# Patient Record
Sex: FEMALE | Race: BLACK OR AFRICAN AMERICAN | Employment: FULL TIME | URBAN - METROPOLITAN AREA
[De-identification: names, ages, dates, MRNs, and addresses within clinical notes are randomized per-mention and may not be internally consistent; named-entity substitution may affect disease eponyms.]

---

## 2018-06-13 PROBLEM — O09.92 SUPERVISION OF HIGH RISK PREGNANCY IN SECOND TRIMESTER: Status: ACTIVE | Noted: 2018-06-13

## 2018-06-13 PROBLEM — O09.30 LATE PRENATAL CARE: Status: ACTIVE | Noted: 2018-06-13

## 2018-06-20 PROBLEM — A74.9 CHLAMYDIA: Status: ACTIVE | Noted: 2018-06-20

## 2018-07-02 PROBLEM — R87.612 LGSIL ON PAP SMEAR OF CERVIX: Status: ACTIVE | Noted: 2018-07-02

## 2018-08-18 ENCOUNTER — ANESTHESIA EVENT (OUTPATIENT)
Dept: LABOR AND DELIVERY | Age: 33
End: 2018-08-18
Payer: COMMERCIAL

## 2018-08-18 ENCOUNTER — ANESTHESIA (OUTPATIENT)
Dept: LABOR AND DELIVERY | Age: 33
End: 2018-08-18
Payer: COMMERCIAL

## 2018-08-18 ENCOUNTER — HOSPITAL ENCOUNTER (INPATIENT)
Age: 33
LOS: 2 days | Discharge: HOME | End: 2018-08-20
Attending: OBSTETRICS & GYNECOLOGY | Admitting: OBSTETRICS & GYNECOLOGY
Payer: COMMERCIAL

## 2018-08-18 PROBLEM — Z33.1 IUP (INTRAUTERINE PREGNANCY), INCIDENTAL: Status: ACTIVE | Noted: 2018-08-18

## 2018-08-18 LAB
ABO + RH BLD: NORMAL
BASOPHILS # BLD: 0 K/UL (ref 0–0.1)
BASOPHILS NFR BLD: 0 % (ref 0–2)
BLOOD GROUP ANTIBODIES SERPL: NORMAL
DIFFERENTIAL METHOD BLD: NORMAL
EOSINOPHIL # BLD: 0 K/UL (ref 0–0.4)
EOSINOPHIL NFR BLD: 0 % (ref 0–5)
ERYTHROCYTE [DISTWIDTH] IN BLOOD BY AUTOMATED COUNT: 13.9 % (ref 11.6–14.5)
HCT VFR BLD AUTO: 37.3 % (ref 35–45)
HGB BLD-MCNC: 12.7 G/DL (ref 12–16)
LYMPHOCYTES # BLD: 3.1 K/UL (ref 0.9–3.6)
LYMPHOCYTES NFR BLD: 28 % (ref 21–52)
MCH RBC QN AUTO: 29 PG (ref 24–34)
MCHC RBC AUTO-ENTMCNC: 34 G/DL (ref 31–37)
MCV RBC AUTO: 85.2 FL (ref 74–97)
MONOCYTES # BLD: 0.8 K/UL (ref 0.05–1.2)
MONOCYTES NFR BLD: 8 % (ref 3–10)
NEUTS SEG # BLD: 6.9 K/UL (ref 1.8–8)
NEUTS SEG NFR BLD: 64 % (ref 40–73)
PLATELET # BLD AUTO: 217 K/UL (ref 135–420)
PMV BLD AUTO: 10 FL (ref 9.2–11.8)
RBC # BLD AUTO: 4.38 M/UL (ref 4.2–5.3)
SPECIMEN EXP DATE BLD: NORMAL
WBC # BLD AUTO: 10.9 K/UL (ref 4.6–13.2)

## 2018-08-18 PROCEDURE — 65270000029 HC RM PRIVATE

## 2018-08-18 PROCEDURE — 86900 BLOOD TYPING SEROLOGIC ABO: CPT | Performed by: OBSTETRICS & GYNECOLOGY

## 2018-08-18 PROCEDURE — 88307 TISSUE EXAM BY PATHOLOGIST: CPT | Performed by: OBSTETRICS & GYNECOLOGY

## 2018-08-18 PROCEDURE — 76060000078 HC EPIDURAL ANESTHESIA

## 2018-08-18 PROCEDURE — 75410000000 HC DELIVERY VAGINAL/SINGLE

## 2018-08-18 PROCEDURE — 00HU33Z INSERTION OF INFUSION DEVICE INTO SPINAL CANAL, PERCUTANEOUS APPROACH: ICD-10-PCS | Performed by: ANESTHESIOLOGY

## 2018-08-18 PROCEDURE — 74011250636 HC RX REV CODE- 250/636: Performed by: OBSTETRICS & GYNECOLOGY

## 2018-08-18 PROCEDURE — 85025 COMPLETE CBC W/AUTO DIFF WBC: CPT | Performed by: OBSTETRICS & GYNECOLOGY

## 2018-08-18 PROCEDURE — 77030007879 HC KT SPN EPDRL TELE -B: Performed by: ANESTHESIOLOGY

## 2018-08-18 PROCEDURE — 75410000003 HC RECOV DEL/VAG/CSECN EA 0.5 HR

## 2018-08-18 PROCEDURE — 75410000002 HC LABOR FEE PER 1 HR

## 2018-08-18 PROCEDURE — 74011250636 HC RX REV CODE- 250/636: Performed by: ANESTHESIOLOGY

## 2018-08-18 PROCEDURE — 74011000250 HC RX REV CODE- 250

## 2018-08-18 PROCEDURE — 74011250636 HC RX REV CODE- 250/636

## 2018-08-18 PROCEDURE — 74011250637 HC RX REV CODE- 250/637: Performed by: OBSTETRICS & GYNECOLOGY

## 2018-08-18 RX ORDER — FENTANYL CITRATE 50 UG/ML
INJECTION, SOLUTION INTRAMUSCULAR; INTRAVENOUS AS NEEDED
Status: DISCONTINUED | OUTPATIENT
Start: 2018-08-18 | End: 2018-08-18 | Stop reason: HOSPADM

## 2018-08-18 RX ORDER — ZOLPIDEM TARTRATE 5 MG/1
5 TABLET ORAL
Status: DISCONTINUED | OUTPATIENT
Start: 2018-08-18 | End: 2018-08-20 | Stop reason: HOSPADM

## 2018-08-18 RX ORDER — LIDOCAINE HYDROCHLORIDE 10 MG/ML
20 INJECTION, SOLUTION EPIDURAL; INFILTRATION; INTRACAUDAL; PERINEURAL AS NEEDED
Status: DISCONTINUED | OUTPATIENT
Start: 2018-08-18 | End: 2018-08-18

## 2018-08-18 RX ORDER — IBUPROFEN 400 MG/1
800 TABLET ORAL
Status: DISCONTINUED | OUTPATIENT
Start: 2018-08-18 | End: 2018-08-20 | Stop reason: HOSPADM

## 2018-08-18 RX ORDER — SODIUM CHLORIDE 0.9 % (FLUSH) 0.9 %
5-10 SYRINGE (ML) INJECTION EVERY 8 HOURS
Status: DISCONTINUED | OUTPATIENT
Start: 2018-08-18 | End: 2018-08-18

## 2018-08-18 RX ORDER — FENTANYL CITRATE 50 UG/ML
100 INJECTION, SOLUTION INTRAMUSCULAR; INTRAVENOUS ONCE
Status: DISCONTINUED | OUTPATIENT
Start: 2018-08-18 | End: 2018-08-18

## 2018-08-18 RX ORDER — SODIUM CHLORIDE 0.9 % (FLUSH) 0.9 %
5-10 SYRINGE (ML) INJECTION AS NEEDED
Status: DISCONTINUED | OUTPATIENT
Start: 2018-08-18 | End: 2018-08-18 | Stop reason: HOSPADM

## 2018-08-18 RX ORDER — LIDOCAINE HYDROCHLORIDE AND EPINEPHRINE 20; 5 MG/ML; UG/ML
INJECTION, SOLUTION EPIDURAL; INFILTRATION; INTRACAUDAL; PERINEURAL AS NEEDED
Status: DISCONTINUED | OUTPATIENT
Start: 2018-08-18 | End: 2018-08-18 | Stop reason: HOSPADM

## 2018-08-18 RX ORDER — AMOXICILLIN 250 MG
1 CAPSULE ORAL
Status: DISCONTINUED | OUTPATIENT
Start: 2018-08-18 | End: 2018-08-20 | Stop reason: HOSPADM

## 2018-08-18 RX ORDER — BUTORPHANOL TARTRATE 1 MG/ML
2 INJECTION INTRAMUSCULAR; INTRAVENOUS
Status: DISCONTINUED | OUTPATIENT
Start: 2018-08-18 | End: 2018-08-18

## 2018-08-18 RX ORDER — OXYCODONE AND ACETAMINOPHEN 5; 325 MG/1; MG/1
2 TABLET ORAL
Status: DISCONTINUED | OUTPATIENT
Start: 2018-08-18 | End: 2018-08-20 | Stop reason: HOSPADM

## 2018-08-18 RX ORDER — METHYLERGONOVINE MALEATE 0.2 MG/ML
0.2 INJECTION INTRAVENOUS AS NEEDED
Status: DISCONTINUED | OUTPATIENT
Start: 2018-08-18 | End: 2018-08-18

## 2018-08-18 RX ORDER — NALBUPHINE HYDROCHLORIDE 10 MG/ML
10 INJECTION, SOLUTION INTRAMUSCULAR; INTRAVENOUS; SUBCUTANEOUS
Status: DISCONTINUED | OUTPATIENT
Start: 2018-08-18 | End: 2018-08-18

## 2018-08-18 RX ORDER — HYDROMORPHONE HYDROCHLORIDE 2 MG/ML
1 INJECTION, SOLUTION INTRAMUSCULAR; INTRAVENOUS; SUBCUTANEOUS
Status: DISCONTINUED | OUTPATIENT
Start: 2018-08-18 | End: 2018-08-18

## 2018-08-18 RX ORDER — PROMETHAZINE HYDROCHLORIDE 25 MG/ML
25 INJECTION, SOLUTION INTRAMUSCULAR; INTRAVENOUS
Status: DISCONTINUED | OUTPATIENT
Start: 2018-08-18 | End: 2018-08-20 | Stop reason: HOSPADM

## 2018-08-18 RX ORDER — FENTANYL/ROPIVACAINE/NS/PF 2MCG/ML-.1
PLASTIC BAG, INJECTION (ML) EPIDURAL
Status: COMPLETED
Start: 2018-08-18 | End: 2018-08-18

## 2018-08-18 RX ORDER — FENTANYL/ROPIVACAINE/NS/PF 2MCG/ML-.1
1-15 PLASTIC BAG, INJECTION (ML) EPIDURAL
Status: DISCONTINUED | OUTPATIENT
Start: 2018-08-18 | End: 2018-08-18

## 2018-08-18 RX ORDER — MINERAL OIL
30 OIL (ML) ORAL AS NEEDED
Status: COMPLETED | OUTPATIENT
Start: 2018-08-18 | End: 2018-08-18

## 2018-08-18 RX ORDER — PHENYLEPHRINE HCL IN 0.9% NACL 1 MG/10 ML
80 SYRINGE (ML) INTRAVENOUS AS NEEDED
Status: DISCONTINUED | OUTPATIENT
Start: 2018-08-18 | End: 2018-08-18

## 2018-08-18 RX ORDER — TERBUTALINE SULFATE 1 MG/ML
0.25 INJECTION SUBCUTANEOUS
Status: DISCONTINUED | OUTPATIENT
Start: 2018-08-18 | End: 2018-08-18

## 2018-08-18 RX ORDER — OXYTOCIN/RINGER'S LACTATE 20/1000 ML
125 PLASTIC BAG, INJECTION (ML) INTRAVENOUS CONTINUOUS
Status: DISCONTINUED | OUTPATIENT
Start: 2018-08-18 | End: 2018-08-18

## 2018-08-18 RX ORDER — NALOXONE HYDROCHLORIDE 0.4 MG/ML
0.2 INJECTION, SOLUTION INTRAMUSCULAR; INTRAVENOUS; SUBCUTANEOUS AS NEEDED
Status: DISCONTINUED | OUTPATIENT
Start: 2018-08-18 | End: 2018-08-18

## 2018-08-18 RX ORDER — ACETAMINOPHEN 325 MG/1
650 TABLET ORAL
Status: DISCONTINUED | OUTPATIENT
Start: 2018-08-18 | End: 2018-08-20 | Stop reason: HOSPADM

## 2018-08-18 RX ORDER — SODIUM CHLORIDE, SODIUM LACTATE, POTASSIUM CHLORIDE, CALCIUM CHLORIDE 600; 310; 30; 20 MG/100ML; MG/100ML; MG/100ML; MG/100ML
125 INJECTION, SOLUTION INTRAVENOUS CONTINUOUS
Status: DISCONTINUED | OUTPATIENT
Start: 2018-08-18 | End: 2018-08-18

## 2018-08-18 RX ORDER — OXYTOCIN/RINGER'S LACTATE 20/1000 ML
500 PLASTIC BAG, INJECTION (ML) INTRAVENOUS ONCE
Status: COMPLETED | OUTPATIENT
Start: 2018-08-18 | End: 2018-08-18

## 2018-08-18 RX ADMIN — ROPIVACAINE HYDROCHLORIDE 15 ML/HR: 10 INJECTION, SOLUTION EPIDURAL at 06:43

## 2018-08-18 RX ADMIN — LIDOCAINE HYDROCHLORIDE AND EPINEPHRINE 2 ML: 20; 5 INJECTION, SOLUTION EPIDURAL; INFILTRATION; INTRACAUDAL; PERINEURAL at 06:30

## 2018-08-18 RX ADMIN — Medication 10000 MILLI-UNITS/HR: at 08:10

## 2018-08-18 RX ADMIN — Medication 15 ML/HR: at 06:43

## 2018-08-18 RX ADMIN — Medication 30 ML: at 08:05

## 2018-08-18 RX ADMIN — FENTANYL CITRATE 100 MCG: 50 INJECTION, SOLUTION INTRAMUSCULAR; INTRAVENOUS at 06:31

## 2018-08-18 NOTE — PROGRESS NOTES
4727 Dr. Evita Jin. Chino Lambert paged and informed of completely dilated and baby is low per S. Julius Kay RN. MD states she is on her way in.

## 2018-08-18 NOTE — ANESTHESIA PROCEDURE NOTES
Epidural Block    Start time: 8/18/2018 6:15 AM  End time: 8/18/2018 6:32 AM  Performed by: Pino Campbell by: Rafal Taveras     Pre-Procedure  Indication: at surgeon's request and labor epidural    Preanesthetic Checklist: patient identified, risks and benefits discussed, anesthesia consent, site marked, patient being monitored, timeout performed and anesthesia consent    Timeout Time: 06:20        Epidural:   Patient position:  Seated  Prep region:  Lumbar  Prep: Chlorhexidine    Location:  L3-4    Needle and Epidural Catheter:   Needle Type:  Tuohy  Needle Gauge:  17 G  Injection Technique:  Loss of resistance using saline  Attempts:  1  Events: no blood with aspiration, no cerebrospinal fluid with aspiration, no paresthesia and negative aspiration test    Test Dose:  Negative    Assessment:   Catheter Secured:  Tegaderm and tape  Insertion:  Uncomplicated  Patient tolerance:  Patient tolerated the procedure well with no immediate complications

## 2018-08-18 NOTE — IP AVS SNAPSHOT
[address] [place] [id number] 
[phone] Patient: Shea De Leon MRN: [id number] :1985 About your hospitalization You were admitted on:  2018 You last received care in the:  [address] [place] [address] You were discharged on:  2018 Why you were hospitalized Your primary diagnosis was:   Premature Rupture Of Membranes (Pprom) With Onset Of Labor Within 24 Hours Of Rupture In Third Trimester, Antepartum Your diagnoses also included:  Late Prenatal Care, Spontaneous Vaginal Delivery, Iup (Intrauterine Pregnancy), Incidental, Anemia Associated With Acute Blood Loss Follow-up Information Follow up With Details Comments Contact Info None   None (395) Patient stated that they have no PCP Your Scheduled Appointments 2018  9:50 AM EDT  
OB VISIT with Ernie Sandifer, NP  
[facility] ([address] [place] [address]) [address] [place] [phone]  
[phone] 2018  9:50 AM EDT  
OB VISIT with Ernie Sandifer, NP  
[facility] ([address] [place] [address]) [address] [place] [phone]  
[phone] 2018 10:00 AM EDT  
OB VISIT with Dalia Roque MD  
[facility] ([address] [place] [address]) [address] [place] [phone]  
[phone] 2018 10:40 AM EDT  
OB VISIT with Dalia Roque MD  
[facility] ([address] [place] [address]) [address] [place] [phone]  
[phone] Discharge Orders None A check karen indicates which time of day the medication should be taken. My Medications START taking these medications  Instructions Each Dose to Equal  
 Morning Noon Evening Bedtime  
 ibuprofen 800 mg tablet Commonly known as:  MOTRIN Your last dose was: Your next dose is: Take 1 Tab by mouth every eight (8) hours as needed. Indications: Pain 800 mg CONTINUE taking these medications Instructions Each Dose to Equal  
 Morning Noon Evening Bedtime  
 prenatal vit-iron fumarate-fa 27 mg iron- 0.8 mg Tab tablet Your last dose was: Your next dose is: Take 1 Tab by Mouth Once a Day. Where to Get Your Medications These medications were sent to [address], [facility] Hours:  24-hours Phone:  [phone]  
  ibuprofen 800 mg tablet Discharge Instructions POST DELIVERY DISCHARGE INSTRUCTIONS Name: Mel Rosas YOB: 1985 Primary Diagnosis: Active Problems: 
  Late prenatal care (6/13/2018) Overview: Ultrasound 6/15/18  Female fetus. No anomalies observed. Spontaneous vaginal delivery (8/18/2018) Anemia associated with acute blood loss (8/19/2018) General:  
 
Diet/Diet Restrictions: 
Eight 8-ounce glasses of fluid daily (water, juices); avoid excessive caffeine intake. Meals/snacks as desired which are high in fiber and carbohydrates and low in fat and cholesterol. Physical Activity / Restrictions / Safety:  
 
Avoid heavy lifting, no more that 8 lbs. For 2-3 weeks; limit use of stairs to 2 times daily for the first week home. No driving for one week. Avoid intercourse 4-6 weeks, no douching or tampon use. Check with obstetrician before starting or resuming an exercise program.    
 
 
Discharge Instructions/Special Treatment/Home Care Needs:  
 
Continue prenatal vitamins. Continue to use squirt bottle with warm water on your episiotomy after each bathroom use until bleeding stops. If steri-strips applied to your incision, remove in 7-10 days. Call your doctor for the following:  
 
Fever over 101 degrees by mouth. Vaginal bleeding heavier than a normal menstrual period or clot larger than a golf ball. Red streaks or increased swelling of legs, painful red streaks on your breast. 
Painful urination, constipation and increased pain or swelling or discharge with your incision. If you feel extremely anxious or overwhelmed. If you have thoughts of harming yourself and/or your baby. Pain Management:  
 
Pain Management:  
Take Acetaminophen (Tylenol) or Ibuprofen (Advil, Motrin), as directed for pain. Use a warm Sitz bath 3 times daily to relieve episiotomy or hemorrhoidal discomfort. Heating pad to  incision as needed. For hemorrhoidal discomfort, use Tucks and Anusol cream as needed and directed. Follow-Up Care: These are general instructions for a healthy lifestyle: No smoking/ No tobacco products/ Avoid exposure to second hand smoke Surgeon General's Warning:  Quitting smoking now greatly reduces serious risk to your health. Obesity, smoking, and sedentary lifestyle greatly increases your risk for illness A healthy diet, regular physical exercise & weight monitoring are important for maintaining a healthy lifestyle Recognize signs and symptoms of STROKE: 
 
F-face looks uneven A-arms unable to move or move unevenly S-speech slurred or non-existent T-time-call 911 as soon as signs and symptoms begin-DO NOT go Back to bed or wait to see if you get better-TIME IS BRAIN. Signed By: Monica Chavarria                                                                                                   Date: 2018 Time: 10:27 AM 
 
Patient armband removed and given to patient to take home. Patient was informed of the privacy risks if armband lost or stolen [facility] Announcement We are excited to announce that we are making your provider's discharge notes available to you in [employer]. You will see these notes when they are completed and signed by the physician that discharged you from your recent hospital stay. If you have any questions or concerns about any information you see in [employer], please call the Health Information Department where you were seen or reach out to your Primary Care Provider for more information about your plan of care. Introducing [facility]! [address] introduces [employer] patient portal. Now you can access parts of your medical record, email your doctor's office, and request medication refills online. 1. In your internet browser, go to [email or web address] 2. Click on the First Time User? Click Here link in the Sign In box. You will see the New Member Sign Up page. 3. Enter your [employer] Access Code exactly as it appears below. You will not need to use this code after youve completed the sign-up process. If you do not sign up before the expiration date, you must request a new code. · [employer] Access Code: [email or web address] Expires: 9/11/2018 10:06 AM 
 
4. Enter the last four digits of your Social Security Number (xxxx) and Date of Birth (mm/dd/yyyy) as indicated and click Submit. You will be taken to the next sign-up page. 5. Create a [employer] ID. This will be your [employer] login ID and cannot be changed, so think of one that is secure and easy to remember. 6. Create a [employer] password. You can change your password at any time. 7. Enter your Password Reset Question and Answer. This can be used at a later time if you forget your password. 8. Enter your e-mail address. You will receive e-mail notification when new information is available in [address] 9. Click Sign Up. You can now view and download portions of your medical record.  
10. Click the Download Summary menu link to download a portable copy of your medical information. If you have questions, please visit the Frequently Asked Questions section of the [employer] website. Remember, [employer] is NOT to be used for urgent needs. For medical emergencies, dial 911. Now available from your iPhone and Android! Introducing Dontae Pastrana As a [facility] patient, I wanted to make you aware of our electronic visit tool called Dontae Pastrana. [employer] allows you to connect within minutes with a medical provider 24 hours a day, seven days a week via a mobile device or tablet or logging into a secure website from your computer. You can access Dontae Pastrana from anywhere in the [place]. A virtual visit might be right for you when you have a simple condition and feel like you just dont want to get out of bed, or cant get away from work for an appointment, when your regular [facility] provider is not available (evenings, weekends or holidays), or when youre out of town and need minor care. Electronic visits cost only $49 and if the [employer]enef[employer]/LSEO provider determines a prescription is needed to treat your condition, one can be electronically transmitted to a nearby pharmacy*. Please take a moment to enroll today if you have not already done so. The enrollment process is free and takes just a few minutes. To enroll, please download the Kno yang to your tablet or phone, or visit [email or web address] to enroll on your computer. And, as an [address] [place] [address] patient with a [employer] account, the results of your visits will be scanned into your electronic medical record and your primary care provider will be able to view the scanned results. We urge you to continue to see your regular [facility] provider for your ongoing medical care.   And while your primary care provider may not be the one available when you seek a Dontae Pastrana virtual visit, the peace of mind you get from getting a real diagnosis real time can be priceless. For more information on Dontae Pastrana, view our Frequently Asked Questions (FAQs) at [email or web address]. Sincerely, 
 
Birdie Balderas MD 
Chief Medical Officer [address] *:  certain medications cannot be prescribed via Dontae Pastrana Providers Seen During Your Hospitalization Provider Specialty Primary office phone Jose Cruz Harris MD Obstetrics & Gynecology [phone] Your Primary Care Physician (PCP) Primary Care Physician Office Phone Office Fax NONE ** None ** ** None ** You are allergic to the following No active allergies Recent Documentation Height Weight Breastfeeding? BMI OB Status Smoking Status 1.626 m 66.2 kg Unknown 25.06 kg/m2 Recent pregnancy Former Smoker Emergency Contacts Name Discharge Info Relation Home Work Mobile Cousin,[place] DISCHARGE CAREGIVER [3] Boyfriend [17] [phone] Patient Belongings The following personal items are in your possession at time of discharge: 
  Dental Appliances: None         Home Medications: None   Jewelry: Body Piercing  Clothing: None    Other Valuables: Cell Phone Please provide this summary of care documentation to your next provider. Signatures-by signing, you are acknowledging that this After Visit Summary has been reviewed with you and you have received a copy. Patient Signature:  ____________________________________________________________ Date:  ____________________________________________________________  
  
Althia Kras Provider Signature:  ____________________________________________________________ Date:  ____________________________________________________________

## 2018-08-18 NOTE — ROUTINE PROCESS
322:  36w4d patient arrives to unit via ER by wheelchair with partner c/o ROM at 225. Ctx on/off all day. Took patient to room 3 and oriented to room. 0330: Triage questions. Nitrazine equivical.  SVE by TESSA Andrews, RN -3/70/0. Set up EFM and TOCO. After reactive strip, got patient some disposable underwear and a pad to ambulate in. Patient chose to stay in bed.      0452: Paged Dr. Fabien Pruett  0510: Paged Dr. Haines Simpler: Called Dr. Fabien Pruett via cell and discussed patient. Orders to admit. 6769: SVE     615  @ bedside discussing epidural.   618 Consent form signed. 620 Timeout performed. 630 Test dose administered. 631 Catheter placed. 633 Loading dose administered. 635 Pt lying back in bed. 640 PCA pump started. 0720: Bedside and Verbal shift change report given to JULES Casarez (oncoming nurse) by EMILEE Collisn (offgoing nurse). Report included the following information SBAR, Intake/Output, MAR and Recent Results.

## 2018-08-18 NOTE — PROGRESS NOTES
Received patient form L&D. Report received from Arelis. Panfilo Beasley 95 at bedside. Assessment completed. Oriented patient to room, call bell included. Informed of available pain medication. Instructed to call for increased pain, bleeding, large clots and when she needs to void. Patient verbalized understanding. Reviewed admission folder and its contents.

## 2018-08-18 NOTE — ANESTHESIA PREPROCEDURE EVALUATION
Anesthetic History   No history of anesthetic complications            Review of Systems / Medical History  Patient summary reviewed, nursing notes reviewed and pertinent labs reviewed    Pulmonary  Within defined limits                 Neuro/Psych   Within defined limits           Cardiovascular  Within defined limits                     GI/Hepatic/Renal  Within defined limits              Endo/Other  Within defined limits           Other Findings              Physical Exam    Airway  Mallampati: II  TM Distance: 4 - 6 cm  Neck ROM: normal range of motion   Mouth opening: Normal     Cardiovascular  Regular rate and rhythm,  S1 and S2 normal,  no murmur, click, rub, or gallop             Dental  No notable dental hx       Pulmonary  Breath sounds clear to auscultation               Abdominal  GI exam deferred       Other Findings            Anesthetic Plan    ASA: 2  Anesthesia type: epidural          Induction: Intravenous  Anesthetic plan and risks discussed with: Patient

## 2018-08-18 NOTE — PROGRESS NOTES
0720 Bedside and Verbal shift change report given to JULES Black RN (oncoming nurse) by EMILEE Pinedo (offgoing nurse). Report included the following information SBAR, Kardex, Intake/Output and MAR .     0738 Prolonged decel to the 90s. This RN to patient bedside. Patient turned to left lateral.  0744 Patient turned right lateral.  0747 Patient turned to back for FSE placement. Patient SVE 10/100/+3. Called out to RN station for MD to be called to delivery. 138 [facility] GENET Shafer at bedside for delivery. 6145  of viable female infant. Delayed cord clamping of 3 minutes. 7861 Dr Doug Nicole at patient bedside. 2094  of placenta. 1006 Patient up to bathroom. Patient able to void at this time a large amount. Patient requires 1 person assistance to be up at this time. Rocío care performed. Pads and gown changed. 1032 TRANSFER - OUT REPORT:    Verbal report given to LYUDMILA Vargas RN(name) on Karthikeyan Carroll  being transferred to Mother Baby(unit) for routine progression of care       Report consisted of patients Situation, Background, Assessment and   Recommendations(SBAR). Information from the following report(s) SBAR, Kardex, Intake/Output and MAR was reviewed with the receiving nurse. Lines:   Peripheral IV 18 Right Wrist (Active)        Opportunity for questions and clarification was provided.       Patient transported with:   Registered Nurse

## 2018-08-18 NOTE — IP AVS SNAPSHOT
Summary of Care Report The Summary of Care report has been created to help improve care coordination. Users with access to [employer] or [address] (Web-based application) may access additional patient information including the Discharge Summary. If you are not currently a [address] user and need more information, please call the number listed below in the [place] [address] section and ask to be connected with Medical Records. Facility Information Name Address Phone [address] [facility] [address] [phone] Patient Information Patient Name Sex  Esther Perez ([id number]) Female 1985 Discharge Information Admitting Provider Service Area Unit  
 Macario Rivera MD / [phone] [address] / [phone] Discharge Provider Discharge Date/Time Discharge Disposition Destination (none) 2018 Morning (Pending) AHR (none) Patient Language Language ENGLISH [13] Hospital Problems as of 2018  Reviewed: 2018  9:40 AM by Macario Rivera MD  
  
  
  
 Class Noted - Resolved Last Modified POA Active Problems Late prenatal care  2018 - Present 2018 by Macario Rivera MD Yes Entered by Mark Anthony Reynolds NP Overview Signed 2018 10:38 AM by Macario Rivrea MD  
   Ultrasound 6/15/18  Female fetus. No anomalies observed. Spontaneous vaginal delivery  2018 - Present 2018 by Macario Rivera MD No  
  Entered by Macario Rivera MD  
  Anemia associated with acute blood loss  2018 - Present 2018 by Macario Rivera MD No  
  Entered by Macario Rivera MD  
  
Non-Hospital Problems as of 2018  Reviewed: 2018  9:40 AM by Macario Rivera MD  
  
  
  
 Class Noted - Resolved Last Modified Active Problems Supervision of high risk pregnancy in second trimester  6/13/2018 - Present 6/27/2018 by Nasreen Negrete MD  
  Entered by Lucero Amin NP Overview Addendum 6/27/2018 10:34 AM by Nasreen Negrete MD  
    Female, will be named Praveen Abdalla Chlamydia  6/20/2018 - Present 8/9/2018 by Lucero Amin NP Entered by Lucero Amin NP Overview Addendum 8/9/2018 11:56 AM by Lucero Amin NP  
   6/13/18 SARAH BETH in 4 weeks - collected 8/9/18 LGSIL on Pap smear of cervix  7/2/2018 - Present 7/2/2018 by Lucero Amin NP Entered by Lucero Amin NP Overview Signed 7/2/2018  8:19 AM by Lucero Amin NP  
   + HPV, neg 16/18/45. Colpo postpartum You are allergic to the following No active allergies Current Discharge Medication List  
  
START taking these medications Dose & Instructions Dispensing Information Comments  
 ibuprofen 800 mg tablet Commonly known as:  MOTRIN Dose:  800 mg Take 1 Tab by mouth every eight (8) hours as needed. Indications: Pain Quantity:  40 Tab Refills:  1 CONTINUE these medications which have NOT CHANGED Dose & Instructions Dispensing Information Comments  
 prenatal vit-iron fumarate-fa 27 mg iron- 0.8 mg Tab tablet Take 1 Tab by Mouth Once a Day. Refills:  0 Current Immunizations Name Date Tdap 6/13/2018 Surgery Information ID Date/Time Status Primary Surgeon All Procedures Location [id number] 8/18/2018 Posted   [facility]IA [facility] - DO NOT SCHEDULE Follow-up Information Follow up With Details Comments Contact Info None   None (395) Patient stated that they have no PCP Discharge Instructions POST DELIVERY DISCHARGE INSTRUCTIONS Name: Amadou Prince YOB: 1985 Primary Diagnosis: Active Problems: 
  Late prenatal care (6/13/2018) Overview: Ultrasound 6/15/18  Female fetus. No anomalies observed. Spontaneous vaginal delivery (2018) Anemia associated with acute blood loss (2018) General:  
 
Diet/Diet Restrictions: 
Eight 8-ounce glasses of fluid daily (water, juices); avoid excessive caffeine intake. Meals/snacks as desired which are high in fiber and carbohydrates and low in fat and cholesterol. Physical Activity / Restrictions / Safety:  
 
Avoid heavy lifting, no more that 8 lbs. For 2-3 weeks; limit use of stairs to 2 times daily for the first week home. No driving for one week. Avoid intercourse 4-6 weeks, no douching or tampon use. Check with obstetrician before starting or resuming an exercise program.    
 
 
Discharge Instructions/Special Treatment/Home Care Needs:  
 
Continue prenatal vitamins. Continue to use squirt bottle with warm water on your episiotomy after each bathroom use until bleeding stops. If steri-strips applied to your incision, remove in 7-10 days. Call your doctor for the following:  
 
Fever over 101 degrees by mouth. Vaginal bleeding heavier than a normal menstrual period or clot larger than a golf ball. Red streaks or increased swelling of legs, painful red streaks on your breast. 
Painful urination, constipation and increased pain or swelling or discharge with your incision. If you feel extremely anxious or overwhelmed. If you have thoughts of harming yourself and/or your baby. Pain Management:  
 
Pain Management:  
Take Acetaminophen (Tylenol) or Ibuprofen (Advil, Motrin), as directed for pain. Use a warm Sitz bath 3 times daily to relieve episiotomy or hemorrhoidal discomfort. Heating pad to  incision as needed. For hemorrhoidal discomfort, use Tucks and Anusol cream as needed and directed. Follow-Up Care: These are general instructions for a healthy lifestyle: No smoking/ No tobacco products/ Avoid exposure to second hand smoke Surgeon General's Warning:  Quitting smoking now greatly reduces serious risk to your health. Obesity, smoking, and sedentary lifestyle greatly increases your risk for illness A healthy diet, regular physical exercise & weight monitoring are important for maintaining a healthy lifestyle Recognize signs and symptoms of STROKE: 
 
F-face looks uneven A-arms unable to move or move unevenly S-speech slurred or non-existent T-time-call 911 as soon as signs and symptoms begin-DO NOT go Back to bed or wait to see if you get better-TIME IS BRAIN. Signed By: Jung Jo                                                                                                   Date: 8/20/2018 Time: 10:27 AM 
 
Patient armband removed and given to patient to take home. Patient was informed of the privacy risks if armband lost or stolen Chart Review Routing History No Routing History on File

## 2018-08-18 NOTE — PROGRESS NOTES
Bedside and Verbal shift change report given to Francisco Fraser RN (oncoming nurse) by Ray Lee RN   (offgoing nurse). Report given with SBAR and Kardex.

## 2018-08-18 NOTE — H&P
History & Physical    Name: Brian El MRN: [id number]  SSN: [id number]    YOB: 1985  Age: 34 y.o. Sex: female        Subjective:     Estimated Date of Delivery: 18  OB History    Para Term  AB Living   4 1 1 0 2 1   SAB TAB Ectopic Molar Multiple Live Births   0 2 0 0 0 1      # Outcome Date GA Lbr Ky/2nd Weight Sex Delivery Anes PTL Lv   4 Current            3 Term 08   6 lb 14 oz (3.118 kg) M Vag-Spont   JOSE LUIS   2 TAB            1 TAB                   Ms. Louann George is admitted with pregnancy at 36w4d for spontaneous rupture of membranes and contractions. Prenatal course was complicated by late prenatal care. Please see prenatal records for details. Past Medical History:   Diagnosis Date    Anemia     Chlamydia     tested on 18 - cured    Vaginal delivery     x1     Past Surgical History:   Procedure Laterality Date    HX TONSILLECTOMY       Social History     Occupational History    Not on file. Social History Main Topics    Smoking status: Former Smoker    Smokeless tobacco: Never Used    Alcohol use No    Drug use: No    Sexual activity: Yes     Partners: Male     Birth control/ protection: None     Family History   Problem Relation Age of Onset    Breast Cancer Mother 35    Hypertension Father        No Known Allergies  Prior to Admission medications    Medication Sig Start Date End Date Taking? Authorizing Provider   prenatal vit-iron fumarate-fa 27 mg iron- 0.8 mg tab tablet Take 1 Tab by Mouth Once a Day. Yes Historical Provider        Review of Systems: Review of systems not obtained due to patient factors. Objective:     Vitals:  Vitals:    18 0711 18 0716 18 0720 18 0721   BP:  128/70 128/70    Pulse:  72 68    Resp:   16    Temp:   98.1 °F (36.7 °C)    SpO2: 100% 100%  100%   Weight:       Height:            Physical Exam:  Patient without distress.   Abdomen: soft, nontender  Cervical Exam: 2 cm dilated on admission per RN  Membranes:   Premature Rupture of Membranes; Amniotic Fluid: clear fluid  Fetal Heart Rate: Reactive    Prenatal Labs:   Lab Results   Component Value Date/Time    ABO/Rh(D) B POSITIVE 2018 05:49 AM    Rubella, External Immune 2018    GrBStrep, External Negative 2018    HBsAg, External negative 2018    HIV, External NR 2018    RPR, External NR 2018    Gonorrhea, External Negative 2018    Chlamydia, External Negative 2018    ABO,Rh B+ 2018         Assessment/Plan:     Principal Problem:     premature rupture of membranes (PPROM) with onset of labor within 24 hours of rupture in third trimester, antepartum (2018)    Active Problems:    Late prenatal care (2018)      Overview: Ultrasound 6/15/18  Female fetus. No anomalies observed. Spontaneous vaginal delivery (2018)         Plan: Admit for Continue plan for vaginal delivery. Group B Strep was negative.     Signed By:  Heather Shaver MD     2018

## 2018-08-19 PROBLEM — D62 ANEMIA ASSOCIATED WITH ACUTE BLOOD LOSS: Status: ACTIVE | Noted: 2018-08-19

## 2018-08-19 PROBLEM — Z33.1 IUP (INTRAUTERINE PREGNANCY), INCIDENTAL: Status: RESOLVED | Noted: 2018-08-18 | Resolved: 2018-08-19

## 2018-08-19 LAB
HCT VFR BLD AUTO: 31.1 % (ref 35–45)
HGB BLD-MCNC: 10.3 G/DL (ref 12–16)

## 2018-08-19 PROCEDURE — 65270000029 HC RM PRIVATE

## 2018-08-19 PROCEDURE — 85018 HEMOGLOBIN: CPT | Performed by: OBSTETRICS & GYNECOLOGY

## 2018-08-19 PROCEDURE — 36415 COLL VENOUS BLD VENIPUNCTURE: CPT | Performed by: OBSTETRICS & GYNECOLOGY

## 2018-08-19 PROCEDURE — 85014 HEMATOCRIT: CPT | Performed by: OBSTETRICS & GYNECOLOGY

## 2018-08-19 NOTE — PROGRESS NOTES
Post-Partum Day Number 1 Progress Note    Pura Hooks     Assessment:   Hospital Problems  Date Reviewed: 2018          Codes Class Noted POA    * (Principal) premature rupture of membranes (PPROM) with onset of labor within 24 hours of rupture in third trimester, antepartum ICD-10-CM: O42.013  ICD-9-CM: 658.13  2018 Yes        Spontaneous vaginal delivery ICD-10-CM: O80  ICD-9-CM: 650  2018 No        IUP (intrauterine pregnancy), incidental ICD-10-CM: Z34.90  ICD-9-CM: V22.2  2018 Unknown        Late prenatal care ICD-10-CM: O09.30  ICD-9-CM: V23.7  2018 Yes    Overview Signed 2018 10:38 AM by Sharri Harris MD     Ultrasound 6/15/18  Female fetus. No anomalies observed. Doing well, post partum day 1    Plan:  1. Continue routine postpartum and perineal care as well as maternal education. 2. Patient desires discharge today. Will check with pediatrician. Information for the patient's :  Bhakti Getting [[id number]]   Vaginal, Spontaneous Delivery   Patient doing well without significant complaint. Voiding without difficulty, normal lochia. Desires discharge today if possible. Current Facility-Administered Medications   Medication Dose Route Frequency       Vitals:  Visit Vitals    /88 (BP 1 Location: Right arm, BP Patient Position: At rest)    Pulse 61    Temp 97.9 °F (36.6 °C)    Resp 16    Ht 5' 4\" (1.626 m)    Wt 146 lb (66.2 kg)    LMP 2017 (Exact Date)    SpO2 99%    Breastfeeding Unknown    BMI 25.06 kg/m2     Temp (24hrs), Av.4 °F (36.9 °C), Min:97.9 °F (36.6 °C), Max:98.8 °F (37.1 °C)        Exam:   Patient without distress. Abdomen soft, fundus firm, nontender                Perineum with normal lochia noted. Lower extremities are negative for swelling, cords or tenderness.     Labs:     Lab Results   Component Value Date/Time    WBC 10.9 2018 05:49 AM    HGB 10.3 (L) 08/19/2018 04:19 AM    HGB 12.7 08/18/2018 05:49 AM    HCT 31.1 (L) 08/19/2018 04:19 AM    HCT 37.3 08/18/2018 05:49 AM    PLATELET 186 02/61/0956 05:49 AM    Hgb, External 11.2 06/13/2018    Hgb, External 10.2 06/01/2018    Hct, External 34.5 06/13/2018    Hct, External 30.4 06/01/2018    Platelet cnt., External 260 06/13/2018    Platelet cnt., External 215 06/01/2018       Recent Results (from the past 24 hour(s))   HEMOGLOBIN    Collection Time: 08/19/18  4:19 AM   Result Value Ref Range    HGB 10.3 (L) 12.0 - 16.0 g/dL   HEMATOCRIT    Collection Time: 08/19/18  4:19 AM   Result Value Ref Range    HCT 31.1 (L) 35.0 - 45.0 %

## 2018-08-19 NOTE — L&D DELIVERY NOTE
Delivery Summary    Patient: Bonita Moses MRN: [id number]  SSN: [id number]    YOB: 1985  Age: 34 y.o. Sex: female        Labor Events:    Labor: Yes    Rupture Date: 2018    Rupture Time: 7:47 AM    Rupture Type      Amniotic Fluid Volume:      Amniotic Fluid Description:         Induction: None        Augmentation: None    Labor Complications: None     Additional Complications:        Cervical Ripening:       None      Delivery Events:  Episiotomy: None    Laceration(s): None      Repaired: None     Number of Repair Packets: 0    Suture Type and Size:         Estimated Blood Loss (ml): 250        Information for the patient's :  Meño Monsivais [[id number]]     Delivery Summary - Baby    Delivery Date: 2018   Delivery Time: 8:07 AM   Delivery Type: Vaginal, Spontaneous Delivery  Sex:  female  Gestational Age: 37w2d  Delivery Clinician:  Aruna Tipton  Living?: Living   Delivery Location: L&D             APGARS  One minute Five minutes Ten minutes   Skin Color: 0    1       Heart Rate: 2   2         Reflex Irritability: 2   2         Muscle Tone: 2   2       Respiration: 2   2         Total: 8   9           Presentation: Vertex  Position: Left Occiput Anterior  Resuscitation Method:  Suctioning-bulb     Meconium Stained: None    Cord Information: 3 Vessels   Complications: None  Cord Blood Sent?:  Yes    Blood Gases Sent?:  No    Placenta:  Date/Time:   8:14 AM  Removal: Spontaneous      Appearance: Normal      Measurements:  Birth Weight: 5 lb 15.8 oz (2.717 kg)    Birth Length: 1' 6.5\" (0.47 m)   Head Circumference: 1' 0.4\" (0.315 m)     Chest Circumference: 11.22\" (0.285 m)    Abdominal Girth: 1' 0.2\" (0.31 m)    Other Providers:   KALPESH Freitas;MADYSON CRUZ;TONIA HUANG;JORGE CHOUDHURY;CHARLY HOLM Obstetrician;Primary Nurse;Primary  Nurse; Anesthesiologist;Midwife           Cord Blood Results:  Information for the patient's :  [facility] [[id number]]   No results found for: ABORH, PCTABR, PCTDIG, BILI, ABORHEXT, [address]    Information for the patient's :  [facility] [[id number]]   No results found for: APH, APCO2, APO2, AHCO3, ABEC, ABDC, O2ST, SITE, RSCOM, PHI, PCO2I, PO2I, HCO3I, SO2I, IBD     Information for the patient's :  [facility] [[id number]]   No results found for: EPHV, PCO2V, PO2V, HCO3V, O2STV, EBDV

## 2018-08-19 NOTE — ANESTHESIA POSTPROCEDURE EVALUATION
8/19/2018  11:19 AM    Laboring Epidural Follow-up Note     Referring physician: Ryan Melendez, *   Patient status post vaginal delivery with labor epidural    Visit Vitals    /88 (BP 1 Location: Right arm, BP Patient Position: At rest)    Pulse 61    Temp 36.6 °C (97.9 °F)    Resp 16    Ht 5' 4\" (1.626 m)    Wt 66.2 kg (146 lb)    LMP 12/05/2017 (Exact Date)    SpO2 99%    Breastfeeding Unknown    BMI 25.06 kg/m2       Epidural removed by L&D staff  No sedation, pruritis noted. Adequate analgesia.   No obvious anesthesia complications          Juluis Holiday, CRNA

## 2018-08-19 NOTE — PROGRESS NOTES
Bedside and Verbal shift change report given to SHAILESH Hooks (oncoming nurse) by Ray Reed, TAB (offgoing nurse). Report included the following information SBAR, Kardex, Intake/Output, MAR and Recent Results. 2305- Patient in bed, no needs at this time. Infant in bassinet at bedside. Re-educated mother on calling RN to check infant's blood sugar before each feed until morning. 2330- Patient requesting IV removal d/t pain and itching. No other needs at this time. 0700- Bedside and Verbal shift change report given to LYUDMILA Terry, TAB (oncoming nurse) by Mitch Goode. Telma Farooq RN (offgoing nurse). Report included the following information SBAR, Kardex, Intake/Output, MAR and Recent Results.

## 2018-08-19 NOTE — PROGRESS NOTES
Bedside and Verbal shift change report given to Tal Cruz RN (oncoming nurse) by Sergio Price RN   (offgoing nurse). Report given with SBAR and Kardex.

## 2018-08-19 NOTE — PROGRESS NOTES
BEDSIDE_VERBAL_RECORDED_WRITTEN: shift change report given to MELLY Quintana rn (oncoming nurse) by alfonso Collier (offgoing nurse). Report given with Deborah LEE and MAR.

## 2018-08-20 VITALS
WEIGHT: 146 LBS | BODY MASS INDEX: 24.92 KG/M2 | OXYGEN SATURATION: 100 % | HEIGHT: 64 IN | SYSTOLIC BLOOD PRESSURE: 120 MMHG | DIASTOLIC BLOOD PRESSURE: 73 MMHG | HEART RATE: 80 BPM | RESPIRATION RATE: 18 BRPM | TEMPERATURE: 97.6 F

## 2018-08-20 RX ORDER — IBUPROFEN 800 MG/1
800 TABLET ORAL
Qty: 40 TAB | Refills: 1 | Status: SHIPPED | OUTPATIENT
Start: 2018-08-20

## 2018-08-20 NOTE — PROGRESS NOTES
Received care of mother off floor advised pt that she is to remain on units for safety purpose and understood , no distress, call bell in reach,

## 2018-08-20 NOTE — DISCHARGE INSTRUCTIONS
POST DELIVERY DISCHARGE INSTRUCTIONS    Name: Pura Hooks  YOB: 1985  Primary Diagnosis: Active Problems:    Late prenatal care (2018)      Overview: Ultrasound 6/15/18  Female fetus. No anomalies observed. Spontaneous vaginal delivery (2018)      Anemia associated with acute blood loss (2018)        General:     Diet/Diet Restrictions:  Eight 8-ounce glasses of fluid daily (water, juices); avoid excessive caffeine intake. Meals/snacks as desired which are high in fiber and carbohydrates and low in fat and cholesterol. Physical Activity / Restrictions / Safety:     Avoid heavy lifting, no more that 8 lbs. For 2-3 weeks; limit use of stairs to 2 times daily for the first week home. No driving for one week. Avoid intercourse 4-6 weeks, no douching or tampon use. Check with obstetrician before starting or resuming an exercise program.         Discharge Instructions/Special Treatment/Home Care Needs:     Continue prenatal vitamins. Continue to use squirt bottle with warm water on your episiotomy after each bathroom use until bleeding stops. If steri-strips applied to your incision, remove in 7-10 days. Call your doctor for the following:     Fever over 101 degrees by mouth. Vaginal bleeding heavier than a normal menstrual period or clot larger than a golf ball. Red streaks or increased swelling of legs, painful red streaks on your breast.  Painful urination, constipation and increased pain or swelling or discharge with your incision. If you feel extremely anxious or overwhelmed. If you have thoughts of harming yourself and/or your baby. Pain Management:     Pain Management:   Take Acetaminophen (Tylenol) or Ibuprofen (Advil, Motrin), as directed for pain. Use a warm Sitz bath 3 times daily to relieve episiotomy or hemorrhoidal discomfort. Heating pad to  incision as needed.  For hemorrhoidal discomfort, use Tucks and Anusol cream as needed and directed. Follow-Up Care: These are general instructions for a healthy lifestyle:    No smoking/ No tobacco products/ Avoid exposure to second hand smoke    Surgeon General's Warning:  Quitting smoking now greatly reduces serious risk to your health. Obesity, smoking, and sedentary lifestyle greatly increases your risk for illness    A healthy diet, regular physical exercise & weight monitoring are important for maintaining a healthy lifestyle    Recognize signs and symptoms of STROKE:    F-face looks uneven    A-arms unable to move or move unevenly    S-speech slurred or non-existent    T-time-call 911 as soon as signs and symptoms begin-DO NOT go       Back to bed or wait to see if you get better-TIME IS BRAIN. Signed By: Cecil Kaufman                                                                                                   Date: 8/20/2018 Time: 10:27 AM    Patient armband removed and given to patient to take home.   Patient was informed of the privacy risks if armband lost or stolen

## 2018-08-20 NOTE — LACTATION NOTE
Infant latched and nursing well. Mom educated on breastfeeding basics--hunger cues, feeding on demand, waking baby if baby sleeps too long between feeds, importance of skin to skin, positioning and latching, risk of pacifier use and supplemental feedings, and importance of rooming in--and use of log sheet. Mom also educated on benefits of breastfeeding for herself and baby. Breastfeeding discharge teaching completed to include feeding on demand, foremilk and hindmilk importance, engorgement, mastitis, clogged ducts, pumping, breastmilk storage, and returning to work. Information given about breastfeeding support group and unit and office phone numbers provided and encouraged mom to reach out if concerns arise, but that [facility] would be calling her in the next few days to follow up on breastfeeding. Mom verbalized understanding and no questions at this time.

## 2018-08-20 NOTE — PROGRESS NOTES
BEDSIDE_VERBAL_RECORDED_WRITTEN: shift change report given to JAMES Tesfaye rn (oncoming nurse) by alfonso Collier (offgoing nurse). Report given with Deborah LEE and MAR.

## 2018-08-20 NOTE — DISCHARGE SUMMARY
Obstetrical Discharge Summary     Name: Timothy Appiah MRN: [id number]  SSN: [id number]    YOB: 1985  Age: 34 y.o. Sex: female      Admit Date: 2018    Discharge Date: 2018    Admitting Physician: Jadyn Loza MD     Attending Physician:  Jadyn Loza MD     Discharge Diagnoses:   Information for the patient's :  Jaycob Chapman [[id number]]   Delivery of a 5 lb 15.8 oz (2.717 kg) female infant via Vaginal, Spontaneous Delivery on 2018 at 8:07 AM  by . Apgars were 8 and 9. Additional Diagnoses:   Problem List as of 2018  Date Reviewed: 2018          Codes Class Noted - Resolved    Anemia associated with acute blood loss ICD-10-CM: D62  ICD-9-CM: 285.1  2018 - Present        Spontaneous vaginal delivery ICD-10-CM: O80  ICD-9-CM: 372  2018 - Present        LGSIL on Pap smear of cervix ICD-10-CM: R87.612  ICD-9-CM: 795.03  2018 - Present    Overview Signed 2018  8:19 AM by Charito Villalobos NP     + HPV, neg /45. Colpo postpartum             Chlamydia ICD-10-CM: A74.9  ICD-9-CM: 079.98  2018 - Present    Overview Addendum 2018 11:56 AM by Charito Villalobos NP     18  SARAH BETH in 4 weeks - collected 18             Late prenatal care ICD-10-CM: O09.30  ICD-9-CM: V23.7  2018 - Present    Overview Signed 2018 10:38 AM by Jadyn Loza MD     Ultrasound 6/15/18  Female fetus. No anomalies observed.                Supervision of high risk pregnancy in second trimester ICD-10-CM: O09.92  ICD-9-CM: V23.9  2018 - Present    Overview Addendum 2018 10:34 AM by Jadyn Loza MD      Female, will be named Dodie             * (Principal)RESOLVED:  premature rupture of membranes (PPROM) with onset of labor within 24 hours of rupture in third trimester, antepartum ICD-10-CM: O42.013  ICD-9-CM: 658.13  2018 - 2018        RESOLVED: IUP (intrauterine pregnancy), incidental ICD-10-CM: Z34.90  ICD-9-CM: V22.2  8/18/2018 - 8/19/2018              Lab Results   Component Value Date/Time    Rubella, External Immune 06/01/2018    GrBStrep, External Negative 08/09/2018     Recent Labs      08/19/18   0419   [facility] course following the delivery. Patient Instructions:   Current Discharge Medication List      START taking these medications    Details   ibuprofen (MOTRIN) 800 mg tablet Take 1 Tab by mouth every eight (8) hours as needed. Indications: Pain  Qty: 40 Tab, Refills: 1         CONTINUE these medications which have NOT CHANGED    Details   prenatal vit-iron fumarate-fa 27 mg iron- 0.8 mg tab tablet Take 1 Tab by Mouth Once a Day. Reference my discharge instructions.     Follow-up Appointments   Procedures    FOLLOW UP VISIT Appointment in: 6 Weeks     Standing Status:   Standing     Number of Occurrences:   1     Order Specific Question:   Appointment in     Answer:   6 Weeks        Signed By:  Pamela Quiñonez MD     August 20, 2018                       BST

## 2018-08-20 NOTE — PROGRESS NOTES
Post-Partum Day Number 2 Progress Note    Minta See     Assessment:   Hospital Problems  Date Reviewed: 2018          Codes Class Noted POA    Anemia associated with acute blood loss ICD-10-CM: D62  ICD-9-CM: 285.1  2018 No        Spontaneous vaginal delivery ICD-10-CM: O80  ICD-9-CM: 874  2018 No        Late prenatal care ICD-10-CM: O09.30  ICD-9-CM: V23.7  2018 Yes    Overview Signed 2018 10:38 AM by Ester June MD     Ultrasound 6/15/18  Female fetus. No anomalies observed. Doing well, post partum day 2    Plan:   1. Discharge home today  2. Follow up in office in 6 weeks with Ester Juen MD   3. Post partum activity advised, diet as tolerated  4. Discharge Medications: ibuprofen,  and medications prior to admission    Information for the patient's :  Kenya Basurto [[id number]]   Vaginal, Spontaneous Delivery   Patient doing well without significant complaint. Voiding without difficulty, normal lochia. Ready for discharge. Current Facility-Administered Medications   Medication Dose Route Frequency       Vitals:  Visit Vitals    /73 (BP 1 Location: Left arm, BP Patient Position: At rest)    Pulse 80    Temp 97.6 °F (36.4 °C)    Resp 18    Ht 5' 4\" (1.626 m)    Wt 146 lb (66.2 kg)    LMP 2017 (Exact Date)    SpO2 100%    Breastfeeding Unknown    BMI 25.06 kg/m2     Temp (24hrs), Av.9 °F (36.6 °C), Min:97.6 °F (36.4 °C), Max:98.1 °F (36.7 °C)      Exam:         Patient without distress. Abdomen soft, fundus firm, nontender                 Lower extremities are negative for swelling, cords or tenderness.     Labs:     Lab Results   Component Value Date/Time    WBC 10.9 2018 05:49 AM    HGB 10.3 (L) 2018 04:19 AM    HGB 12.7 2018 05:49 AM    HCT 31.1 (L) 2018 04:19 AM    HCT 37.3 2018 05:49 AM    PLATELET 923  05:49 AM    Hgb, External 11.2 2018 Hgb, External 10.2 06/01/2018    Hct, External 34.5 06/13/2018    Hct, External 30.4 06/01/2018    Platelet cnt., External 260 06/13/2018    Platelet cnt., External 215 06/01/2018       No results found for this or any previous visit (from the past 24 hour(s)).

## 2018-08-20 NOTE — ROUTINE PROCESS
Bedside and Verbal shift change report given to Manolo Lundy (oncoming nurse) by EMILEE Dunham (offgoing nurse). Report included the following information SBAR, Intake/Output, MAR and Recent Results.